# Patient Record
Sex: FEMALE | Race: ASIAN | URBAN - METROPOLITAN AREA
[De-identification: names, ages, dates, MRNs, and addresses within clinical notes are randomized per-mention and may not be internally consistent; named-entity substitution may affect disease eponyms.]

---

## 2024-01-18 ENCOUNTER — NURSE TRIAGE (OUTPATIENT)
Dept: OTHER | Age: 24
End: 2024-01-18

## 2024-01-18 NOTE — TELEPHONE ENCOUNTER
Please reply to pool: EM RN TRIAGE  Action Requested: Summary for Provider     []  Critical Lab, Recommendations Needed  [] Need Additional Advice  [x]   FYI    []   Need Orders  [] Need Medications Sent to Pharmacy  []  Other     SUMMARY: New patient contacts clinic reporting back pain that began yesterday.  Low back, denies radiating pain, urinary symptoms or loss of bowel/bladder control.  No known trauma.  Acute visit offered today, patient requests Saturday.  Acute visit scheduled.  Transferred to registration to update insurance.    Reason for call: Back Pain  Onset: Data Unavailable                       Reason for Disposition   Patient wants to be seen    Protocols used: Back Pain-A-[place]

## 2024-03-13 ENCOUNTER — OFFICE VISIT (OUTPATIENT)
Dept: OBGYN CLINIC | Facility: CLINIC | Age: 24
End: 2024-03-13
Payer: COMMERCIAL

## 2024-03-13 ENCOUNTER — LAB ENCOUNTER (OUTPATIENT)
Dept: LAB | Facility: REFERENCE LAB | Age: 24
End: 2024-03-13
Attending: OBSTETRICS & GYNECOLOGY
Payer: COMMERCIAL

## 2024-03-13 VITALS
DIASTOLIC BLOOD PRESSURE: 60 MMHG | WEIGHT: 126 LBS | BODY MASS INDEX: 20.99 KG/M2 | HEIGHT: 65 IN | SYSTOLIC BLOOD PRESSURE: 102 MMHG

## 2024-03-13 DIAGNOSIS — N93.9 ABNORMAL UTERINE BLEEDING: ICD-10-CM

## 2024-03-13 DIAGNOSIS — Z12.4 SCREENING FOR CERVICAL CANCER: ICD-10-CM

## 2024-03-13 DIAGNOSIS — N93.9 ABNORMAL UTERINE BLEEDING: Primary | ICD-10-CM

## 2024-03-13 LAB
DEPRECATED RDW RBC AUTO: 46.4 FL (ref 35.1–46.3)
ERYTHROCYTE [DISTWIDTH] IN BLOOD BY AUTOMATED COUNT: 14 % (ref 11–15)
FSH SERPL-ACNC: 5.5 MIU/ML
HCT VFR BLD AUTO: 42 %
HGB BLD-MCNC: 14.1 G/DL
LH SERPL-ACNC: 2.9 MIU/ML
MCH RBC QN AUTO: 30.4 PG (ref 26–34)
MCHC RBC AUTO-ENTMCNC: 33.6 G/DL (ref 31–37)
MCV RBC AUTO: 90.5 FL
PLATELET # BLD AUTO: 254 10(3)UL (ref 150–450)
PROLACTIN SERPL-MCNC: 9.6 NG/ML
RBC # BLD AUTO: 4.64 X10(6)UL
TSI SER-ACNC: 1.51 MIU/ML (ref 0.55–4.78)
WBC # BLD AUTO: 5.6 X10(3) UL (ref 4–11)

## 2024-03-13 PROCEDURE — 87491 CHLMYD TRACH DNA AMP PROBE: CPT | Performed by: OBSTETRICS & GYNECOLOGY

## 2024-03-13 PROCEDURE — 83002 ASSAY OF GONADOTROPIN (LH): CPT | Performed by: OBSTETRICS & GYNECOLOGY

## 2024-03-13 PROCEDURE — 99204 OFFICE O/P NEW MOD 45 MIN: CPT | Performed by: OBSTETRICS & GYNECOLOGY

## 2024-03-13 PROCEDURE — 3074F SYST BP LT 130 MM HG: CPT | Performed by: OBSTETRICS & GYNECOLOGY

## 2024-03-13 PROCEDURE — 3078F DIAST BP <80 MM HG: CPT | Performed by: OBSTETRICS & GYNECOLOGY

## 2024-03-13 PROCEDURE — 99385 PREV VISIT NEW AGE 18-39: CPT | Performed by: OBSTETRICS & GYNECOLOGY

## 2024-03-13 PROCEDURE — 85027 COMPLETE CBC AUTOMATED: CPT | Performed by: OBSTETRICS & GYNECOLOGY

## 2024-03-13 PROCEDURE — 87591 N.GONORRHOEAE DNA AMP PROB: CPT | Performed by: OBSTETRICS & GYNECOLOGY

## 2024-03-13 PROCEDURE — 87624 HPV HI-RISK TYP POOLED RSLT: CPT | Performed by: OBSTETRICS & GYNECOLOGY

## 2024-03-13 PROCEDURE — 84146 ASSAY OF PROLACTIN: CPT | Performed by: OBSTETRICS & GYNECOLOGY

## 2024-03-13 PROCEDURE — 3008F BODY MASS INDEX DOCD: CPT | Performed by: OBSTETRICS & GYNECOLOGY

## 2024-03-13 PROCEDURE — 83001 ASSAY OF GONADOTROPIN (FSH): CPT | Performed by: OBSTETRICS & GYNECOLOGY

## 2024-03-13 PROCEDURE — 84443 ASSAY THYROID STIM HORMONE: CPT | Performed by: OBSTETRICS & GYNECOLOGY

## 2024-03-13 RX ORDER — TRANEXAMIC ACID 650 MG/1
1300 TABLET ORAL EVERY 8 HOURS PRN
Qty: 50 TABLET | Refills: 2 | Status: SHIPPED | OUTPATIENT
Start: 2024-03-13 | End: 2024-03-18

## 2024-03-13 NOTE — PROGRESS NOTES
GYN H&P     3/13/2024  12:56 PM    CC: Patient is here for abnormal uterine bleeding. She also needs pap.     HPI: Patient is a 23 year old  for 3 M hx of vaginal bleeding daily. When she was 17 she had a similar episode. For the past yeart she has had irregular periods. She could bleeding 1 - 2 W inbetween. No skipped months. She does describe very heavy bleeding, no pain with periods.     No previous evaluation. She has a Nexplanon placed 2022, and her bleeding has become worse since.     She is sexually active, does not use condoms.     Depression Screening (PHQ-2/PHQ-9): Over the LAST 2 WEEKS   Little interest or pleasure in doing things (over the last two weeks)?: Not at all    Feeling down, depressed, or hopeless (over the last two weeks)?: Not at all    PHQ-2 SCORE: 0            Patient's last menstrual period was 2024.    OB History    Para Term  AB Living   0 0 0 0 0 0   SAB IAB Ectopic Multiple Live Births   0 0 0 0 0       GYN hx:    Hx Prior Abnormal Pap: No  Pap Date:  (pt last pap )  Pap Result Notes: normal    STI HX: no hx of STI      Past Medical History:   Diagnosis Date    Depression     Hx of migraine headaches     no auras     History reviewed. No pertinent surgical history.  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        Medications reviewed. See active list.     /60   Ht 65\"   Wt 126 lb (57.2 kg)   LMP 02/28/2024   BMI 20.97 kg/m²       Exam:   GENERAL: well developed, well nourished, in no apparent distress  SKIN: no rashes, no suspicious lesions  HEENT: normal  NECK: supple; no thyroidmegaly, no adenopathy  BREASTS: symmetrical, nontender, no palpable masses or nodes, no nipple discharge, no skin changes, no dippling, no palpable axillary adenopathy  ABDOMEN: Soft, non distended; non tender, no masses.  Liver and spleen non-tender, no enlargement. No palpable hernias  GYNE/:  External Genitalia: Normal appearing, no lesions, normal hair distribution   Urethral meatus appear wnl, no abnormal discharge or lesions noted.   Bladder: well supported, urethra wnl, no palpable tenderness or masses, no discharge  Vagina: normal pink mucosa, no lesions, normal clear discharge.   Uterus: midline, mobile, non-tender, firm and smooth  Cervix: pink, no lesions grossly visible, no discharge + large amount of blood from os  Adnexa: non tender, no palpable masses, normal size  Anus:  No lesions or visible hemorrhoids        A/P: Patient is 23 year old female     1. Abnormal uterine bleeding  - Transvaginal US GYNE Only [78911]; Future  - FSH; Future  - LH (Luteinizing Hormone); Future  - Prolactin  - TSH W Reflex To Free T4; Future  - CBC, Platelet; No Differential; Future  - tranexamic acid (LYSTEDA) 650 MG Oral Tab; Take 2 tablets (1,300 mg total) by mouth every 8 (eight) hours as needed (for heavy vaginal bleeding.).  Dispense: 50 tablet; Refill: 2    Check above. If normal consider stopping Nexplanon and switch to Mirena IUD.   Kira Madrigal MD

## 2024-03-14 LAB
C TRACH DNA SPEC QL NAA+PROBE: NEGATIVE
HPV I/H RISK 1 DNA SPEC QL NAA+PROBE: NEGATIVE
N GONORRHOEA DNA SPEC QL NAA+PROBE: NEGATIVE

## 2024-03-20 ENCOUNTER — ULTRASOUND ENCOUNTER (OUTPATIENT)
Dept: OBGYN CLINIC | Facility: CLINIC | Age: 24
End: 2024-03-20
Payer: COMMERCIAL

## 2024-03-20 DIAGNOSIS — N93.9 ABNORMAL UTERINE BLEEDING (AUB): Primary | ICD-10-CM

## 2024-03-20 PROCEDURE — 76830 TRANSVAGINAL US NON-OB: CPT | Performed by: OBSTETRICS & GYNECOLOGY

## 2024-03-20 PROCEDURE — 76856 US EXAM PELVIC COMPLETE: CPT | Performed by: OBSTETRICS & GYNECOLOGY

## 2024-07-18 ENCOUNTER — OFFICE VISIT (OUTPATIENT)
Dept: FAMILY MEDICINE CLINIC | Facility: CLINIC | Age: 24
End: 2024-07-18
Payer: COMMERCIAL

## 2024-07-18 ENCOUNTER — OFFICE VISIT (OUTPATIENT)
Dept: FAMILY MEDICINE CLINIC | Facility: CLINIC | Age: 24
End: 2024-07-18

## 2024-07-18 VITALS
SYSTOLIC BLOOD PRESSURE: 110 MMHG | HEIGHT: 65 IN | WEIGHT: 125 LBS | BODY MASS INDEX: 20.83 KG/M2 | HEART RATE: 74 BPM | OXYGEN SATURATION: 98 % | TEMPERATURE: 99 F | DIASTOLIC BLOOD PRESSURE: 82 MMHG | RESPIRATION RATE: 16 BRPM

## 2024-07-18 DIAGNOSIS — Z02.0 ENCOUNTER FOR SCHOOL HISTORY AND PHYSICAL EXAMINATION: Primary | ICD-10-CM

## 2024-07-18 DIAGNOSIS — Z11.1 ENCOUNTER FOR PPD TEST: Primary | ICD-10-CM

## 2024-07-18 PROCEDURE — 86580 TB INTRADERMAL TEST: CPT | Performed by: NURSE PRACTITIONER

## 2024-07-18 PROCEDURE — 99395 PREV VISIT EST AGE 18-39: CPT | Performed by: NURSE PRACTITIONER

## 2024-07-18 NOTE — PROGRESS NOTES
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     Patient has concerns of none.    Seizure like activity noted at home 2 years ago (first time it happened). Was seen in ER. Advised to see neuro - never did. Monday (3 days ago) - happened again. Trembling in bed. No LOC. No loss of bowel/bladder. Placed call to neuro - awaiting call back to schedule appt.     Immunizations needed today: none. TB test done at visit.     Current Outpatient Medications   Medication Sig Dispense Refill    buPROPion 150 MG Oral Tablet 24 Hr       Etonogestrel 68 MG Subcutaneous Implant Inject 1 each into the skin continuous.      topiramate 100 MG Oral Tab         Past Medical History:    Depression    Hx of migraine headaches    no auras      History reviewed. No pertinent surgical history.   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       Occ: Nurse. : No. Children: No.   Exercise: three times per week, healthclub.  Diet: doesn't watch      REVIEW OF SYSTEMS:   GENERAL: Feels well. No night sweats. No Fevers.   SKIN: denies any unusual skin lesions  EYES:denies blurred vision or double vision  HEENT: denies nasal congestion, sinus pain or sore throat  LUNGS: denies shortness of breath at rest on on exertion  CARDIOVASCULAR: denies chest pain or palpitations   GI: denies abdominal pain or heartburn.  No N/V/C/D.  MUSCULOSKELETAL: denies back pain or other joint pain  NEURO: denies headaches  PSYCHE: + hx depression on medication, some anxiety also controlled with meds  HEMATOLOGIC: denies hx of anemia or other bleeding disorders  ENDOCRINE: denies thyroid history  ALLERGY/ASTHMA: no hx of seasonal allergies or asthma    EXAM:   /82   Pulse 74   Temp 98.6 °F (37 °C)   Resp 16   Ht 5' 5\" (1.651 m)   Wt 125 lb (56.7 kg)   LMP 02/28/2024   SpO2 98%   BMI 20.80 kg/m²  Body mass index is 20.8 kg/m².     GENERAL: well developed, well nourished,in no apparent distress  SKIN: no rashes,no suspicious lesions  HEENT: atraumatic, normocephalic,ears and throat are clear  EYES:PERRLA, EOMI, normal optic disk,conjunctiva are clear  NECK: supple,no adenopathy,no bruits  CHEST: no chest tenderness  LUNGS: Clear to auscultation bilaterally. No diminished breath sounds. No wheezing, rhonchi, or rales.  CARDIO: RRR without murmur  GI: BS's present x4., No tenderness of palpation.  No obvious masses or palpable organomegaly   MUSCULOSKELETAL: back is not tender, ROM of the back fully intact  EXTREMITIES: no cyanosis, clubbing or edema  NEURO: Alert & Oriented x3. Cranial nerves are grossly intact.     ASSESSMENT AND PLAN:   Yeonji Go is a 23 year old female who presents for a school physical.    ASSESSMENT:     PLAN: Patient was found free of any mental or physical condition which would prevent her from performing her work.  Physical form was filled out. Patient was encouraged to watch exercise and dietary habits.     Form filled out and given to patient.   I did make note of questionable seizure like activity and pending f/u with neuro. Advised any restrictions pending neuro visit/findings.    Patient's questions/concerns were addressed and answered. Patient is in agreement with treatment plan.     Patient is to follow up as needed with PCP or here in clinic.

## 2024-07-18 NOTE — PROGRESS NOTES
Pt presents for tb test. No contraindications for screening after reviewing pt questionnaire.  PPD test administered to left forearm. Pt tolerated well. Discussed and reviewed the return-window to have test read (return to clinic in 48-72 hours to have results of TB test read). Patient aware if she does not return during this time, the test will need to be repeated. Paper documentation of TB test with results will be provided to patient at the time of read. Pt verbalized understanding.

## 2024-07-18 NOTE — PATIENT INSTRUCTIONS
Please return to clinic on 7/19/24 after 4:15pm  or on 7/20/24 before 4:15pm  to have your TB test read.    Our clinic hours are:  Monday-Friday        8:00 am to 7:30 pm  Saturday/Sunday    9:00 am to 4:30 pm    You can go to any of the [facility] to have your TB test read.  To find your nearest Walk-In Clinic go to [email or web address]

## 2024-07-18 NOTE — PATIENT INSTRUCTIONS
Please return to clinic on 7/19/24 after 4:15pm  or on 7/20/24 before 4:15pm  to have your TB test read.    Our clinic hours are:  Monday-Friday        8:00 am to 7:30 pm  Saturday/Sunday    9:00 am to 4:30 pm    You can go to any of the [facility] Walk-In Clinics to have your TB test read.  To find your nearest Walk-In Clinic go to [email or web address]

## 2024-07-21 ENCOUNTER — OFFICE VISIT (OUTPATIENT)
Dept: FAMILY MEDICINE CLINIC | Facility: CLINIC | Age: 24
End: 2024-07-21
Payer: COMMERCIAL

## 2024-07-21 DIAGNOSIS — Z11.1 SCREENING-PULMONARY TB: Primary | ICD-10-CM

## 2024-07-21 LAB — INDURATION (): 0 MM (ref 0–11)

## 2024-07-21 NOTE — PROGRESS NOTES
Patient presents for PPD read.     Test placed on 7/18/2024    Results: 0 mm induration.     Letter of results printed and given patient.   Patient to return in 1 week for second PPD to be placed.  No further questions or concerns at this time.     Encounter Diagnosis   Name Primary?    Screening-pulmonary TB Yes

## 2024-10-04 ENCOUNTER — HOSPITAL ENCOUNTER (OUTPATIENT)
Age: 24
Discharge: HOME | End: 2024-10-04
Payer: COMMERCIAL

## 2024-10-04 ENCOUNTER — APPOINTMENT (OUTPATIENT)
Dept: GENERAL RADIOLOGY | Age: 24
End: 2024-10-04
Attending: NURSE PRACTITIONER
Payer: COMMERCIAL

## 2024-10-04 ENCOUNTER — APPOINTMENT (OUTPATIENT)
Dept: ULTRASOUND IMAGING | Age: 24
End: 2024-10-04
Attending: NURSE PRACTITIONER
Payer: COMMERCIAL

## 2024-10-04 VITALS
DIASTOLIC BLOOD PRESSURE: 70 MMHG | HEART RATE: 69 BPM | TEMPERATURE: 98 F | SYSTOLIC BLOOD PRESSURE: 113 MMHG | OXYGEN SATURATION: 100 % | RESPIRATION RATE: 20 BRPM

## 2024-10-04 DIAGNOSIS — M25.561 RIGHT KNEE PAIN, UNSPECIFIED CHRONICITY: ICD-10-CM

## 2024-10-04 DIAGNOSIS — M25.571 RIGHT ANKLE PAIN, UNSPECIFIED CHRONICITY: ICD-10-CM

## 2024-10-04 DIAGNOSIS — M54.50 RIGHT LOW BACK PAIN, UNSPECIFIED CHRONICITY, UNSPECIFIED WHETHER SCIATICA PRESENT: ICD-10-CM

## 2024-10-04 DIAGNOSIS — M79.661 RIGHT CALF PAIN: Primary | ICD-10-CM

## 2024-10-04 LAB — B-HCG UR QL: NEGATIVE

## 2024-10-04 PROCEDURE — 73610 X-RAY EXAM OF ANKLE: CPT | Performed by: NURSE PRACTITIONER

## 2024-10-04 PROCEDURE — 72100 X-RAY EXAM L-S SPINE 2/3 VWS: CPT | Performed by: NURSE PRACTITIONER

## 2024-10-04 PROCEDURE — 73560 X-RAY EXAM OF KNEE 1 OR 2: CPT | Performed by: NURSE PRACTITIONER

## 2024-10-04 PROCEDURE — 81025 URINE PREGNANCY TEST: CPT | Performed by: NURSE PRACTITIONER

## 2024-10-04 PROCEDURE — 99214 OFFICE O/P EST MOD 30 MIN: CPT | Performed by: NURSE PRACTITIONER

## 2024-10-04 PROCEDURE — 93971 EXTREMITY STUDY: CPT | Performed by: NURSE PRACTITIONER

## 2024-10-04 RX ORDER — METHOCARBAMOL 500 MG/1
500 TABLET, FILM COATED ORAL 2 TIMES DAILY PRN
Qty: 10 TABLET | Refills: 0 | Status: SHIPPED | OUTPATIENT
Start: 2024-10-04

## 2024-10-04 NOTE — ED INITIAL ASSESSMENT (HPI)
Pt here with complaints of right lower back pain , right knee  and right calf pain for 1 month, pt states she walks  a lot for work , pt state she started limping more this past week pt denies any injury

## 2024-10-04 NOTE — DISCHARGE INSTRUCTIONS
Follow-up with your primary care provider recommend you follow-up with the physiatrist for the back pain.  You may try topical lidocaine patch or IcyHot to the back you may take over-the-counter ibuprofen or Tylenol for pain.  You may alternate heat or ice to area of discomfort take the muscle relaxer as prescribed it will cause drowsiness no driving or operating machinery while taking this medication.  If you wake up cannot feel your lower extremities or ambulate unintentionally urinate or have a bowel movement on yourself develop any urinary symptoms abdominal pain fever nausea or vomiting go to the nearest emergency department.

## 2024-10-04 NOTE — ED PROVIDER NOTES
Patient Seen in: [facility]      History     Chief Complaint   Patient presents with    Back Pain    Knee Pain     Stated Complaint: back pain, R leg Pain    Subjective:   HPI  ED History source : Patient  This is a 23-year-old female with history of depression and migraine headaches presenting with back pain knee pain calf pain and ankle pain.  Patient states she has had pain for about a month feels most of the pain with walking and she does a lot of walking for work.  Patient states she was told that she does have a bunion on the right foot and they recommended that she wear a bigger shoe.  Denies falling or landing on her back numbness or tingling to extremities.  Patient states the pain now feels like it is in her calf and behind her knee mainly.  Patient states she is on birth control has a Nexplanon but no oral birth control.  No history of DVT or PE.  No chest pain or shortness of breath.  Denies any urinary symptoms saddle paresthesia or bowel or bladder incontinence.    Objective:     Past Medical History:    Depression    Hx of migraine headaches    no auras              History reviewed. No pertinent surgical history.             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               Review of Systems    Positive for stated complaint: back pain, R leg Pain  Other systems are as noted in HPI.  Constitutional and vital signs reviewed.      All other systems reviewed and negative except as noted above.    Physical Exam     ED Triage Vitals [10/04/24 1450]   /70    Pulse 69   Resp 20   Temp 97.7 °F (36.5 °C)   Temp src Temporal   SpO2 100 %   O2 Device None (Room air)       Current Vitals:   Vital Signs  BP: 113/70  Pulse: 69  Resp: 20  Temp: 97.7 °F (36.5 °C)  Temp src: Temporal    Oxygen Therapy  SpO2: 100 %  O2 Device: None (Room air)        Physical Exam  Vitals and nursing note reviewed.   Constitutional:       Appearance: Normal appearance.   HENT:      Right Ear: External ear normal.      Left Ear: External ear normal.      Nose: Nose normal.      Mouth/Throat:      Mouth: Mucous membranes are moist.      Pharynx: Oropharynx is clear.   Eyes:      Conjunctiva/sclera: Conjunctivae normal.   Cardiovascular:      Rate and Rhythm: Normal rate.   Pulmonary:      Effort: Pulmonary effort is normal.   Abdominal:      Palpations: Abdomen is soft.      Tenderness: There is no abdominal tenderness. There is no right CVA tenderness or left CVA tenderness.   Musculoskeletal:         General: Normal range of motion.      Cervical back: Normal range of motion.      Lumbar back: Negative right straight leg raise test and negative left straight leg raise test.      Comments: No obvious obvious unilateral swelling to the lower extremities but complaint of pain with palpation of the right calf and posterior knee no erythema or warmth.  No TTP anterior right knee no ankle TTP no hip or right lumbar region TTP normal ROM DPs 2+ surrounding compartments are soft.    Cervical thoracic lumbar spine no midline TTP or step-offs   Skin:     General: Skin is warm.      Capillary Refill: Capillary refill takes less than 2 seconds.   Neurological:      General: No focal deficit present.      Mental Status: She is alert and oriented to person, place, and time.           ED Course     Labs Reviewed   POCT PREGNANCY URINE - Normal        XR LUMBAR SPINE (MIN 2 VIEWS) (CPT=72100)    Result Date: 10/4/2024  CONCLUSION:  1. No fracture or subluxation.  Minimal mid lumbar dextroscoliosis.  No  significant spondylosis.    Dictated by (CST): Unruly Harrington MD on 10/04/2024 at 3:41 PM     Finalized by (CST): Unruly Harrington MD on 10/04/2024 at 3:45 PM          XR KNEE (1 OR 2 VIEWS), RIGHT (CPT=73560)    Result Date: 10/4/2024  CONCLUSION: Normal examination.     Dictated by (CST): Unruly Harrington MD on 10/04/2024 at 3:40 PM     Finalized by (CST): Unruly Harrington MD on 10/04/2024 at 3:41 PM          XR ANKLE (MIN 3 VIEWS), RIGHT (CPT=73610)    Result Date: 10/4/2024  CONCLUSION: Normal examination.     Dictated by (CST): Unruly Harrington MD on 10/04/2024 at 3:40 PM     Finalized by (CST): Unruly Harrington MD on 10/04/2024 at 3:40 PM          US VENOUS DOPPLER LEG RIGHT - DIAG IMG (CPT=93971)    Result Date: 10/4/2024  CONCLUSION: No evidence of right lower extremity DVT.   Dictated by (CST): Jaret Read MD on 10/04/2024 at 3:18 PM     Finalized by (CST): Jaret Read MD on 10/04/2024 at 3:20 PM                MDM       Medical Decision Making  23-year-old female ambulatory with steady gait with a month of right lower back pain right knee right ankle and now right calf pain.  DDx radiculopathy versus acute low back pain with sciatica versus DVT versus Baker's cyst versus calf strain or sprain versus arthralgia.  Discussed with the patient ultrasound of the right lower extremity will obtain a UCG for lumbar spine x-ray knee x-ray and ankle x-ray anticipate referring patient to physiatry for further evaluation.  Patient is agreeable with this plan of care.    UCG negative  Ultrasound is resulted above  All x-rays independently viewed by this provider no fractures noted    Discussed results with the patient discussed could be radicular pain or low back pain with sciatica or arthralgia discussed recommended following up with PCP and physiatry discussed muscle relaxer that will be prescribed to help loosen up the muscles discussed over-the-counter ibuprofen and Tylenol for pain may try lidocaine patch or IcyHot or  alternating heat or ice discussed strict ER precautions with any new or worsening symptoms.  Patient acknowledged understanding discharge instructions.      Problems Addressed:  Right ankle pain, unspecified chronicity: acute illness or injury  Right calf pain: acute illness or injury  Right knee pain, unspecified chronicity: acute illness or injury  Right low back pain, unspecified chronicity, unspecified whether sciatica present: acute illness or injury    Amount and/or Complexity of Data Reviewed  Radiology: ordered and independent interpretation performed. Decision-making details documented in ED Course.    Risk  OTC drugs.  Prescription drug management.        Disposition and Plan     Clinical Impression:  1. Right calf pain    2. Right low back pain, unspecified chronicity, unspecified whether sciatica present    3. Right knee pain, unspecified chronicity    4. Right ankle pain, unspecified chronicity         Disposition:  Discharge  10/4/2024  3:46 pm    Follow-up:  Meena Montez MD  [address]  [facility] [id number] [phone]      Resource for primary care doctor to follow-up with if you do not have one    Angel Rose MD  [address]  [place] [id number] [phone]      Physiatrist to follow-up with    Annie Ross DPM  [address]  [place] [id number] [phone]      Podiatrist to follow-up with          Medications Prescribed:  Current Discharge Medication List        START taking these medications    Details   methocarbamol 500 MG Oral Tab Take 1 tablet (500 mg total) by mouth 2 (two) times daily as needed (Muscle spasms May cause drowsiness no driving or operating machinery while taking this medication).  Qty: 10 tablet, Refills: 0    Associated Diagnoses: Right calf pain; Right low back pain, unspecified chronicity, unspecified whether sciatica present; Right knee pain, unspecified chronicity; Right ankle pain, unspecified chronicity                  Supplementary Documentation:

## 2024-12-09 ENCOUNTER — OFFICE VISIT (OUTPATIENT)
Dept: OBGYN CLINIC | Facility: CLINIC | Age: 24
End: 2024-12-09
Payer: COMMERCIAL

## 2024-12-09 VITALS
WEIGHT: 129 LBS | SYSTOLIC BLOOD PRESSURE: 116 MMHG | HEIGHT: 65 IN | BODY MASS INDEX: 21.49 KG/M2 | DIASTOLIC BLOOD PRESSURE: 70 MMHG

## 2024-12-09 DIAGNOSIS — Z30.46 ENCOUNTER FOR REMOVAL OF SUBDERMAL CONTRACEPTIVE IMPLANT: Primary | ICD-10-CM

## 2024-12-09 NOTE — PROGRESS NOTES
Here for Nexplanon removal. Plans to use Nuvaring.    L arm cleaned, injected with 2 ml of 1% lidocaine  Small incision made and Nexplanon removed intact.     FU as needed.

## (undated) NOTE — LETTER
July 21, 2024    Yeonji Go  [address]  [address]  [facility] [address]      Dear Yeonji:    The following are the results of your recent tests. Please review the list of test results.  Your result is the value on the left; we have also supplied the range of normal (low and high) values.    Results for orders placed or performed in visit on 07/18/24   TB test, PPD/Tubersol/Aplisol (71257) (DX V74.1/Z11.1)   Result Value Ref Range    Date Given: 7/18/2024     Site: left     INDURATION (PPD) 0 0.0 - 11 mm       Please call if you have further questions,    ALCON Elizabeth

## (undated) NOTE — LETTER
Yeonji Nicko, :2000    CONSENT FOR PROCEDURE/SEDATION    1. I authorize the performance upon Yeonji Nicok  the following: Nexplanon Removal    2. I authorize Dr. Kira Madrigal MD (and whomever is designated as the doctor’s assistant), to perform the above-mentioned procedures.    3. If any unforeseen conditions arise during this procedure calling for additional  procedures, operations, or medications (including anesthesia and blood transfusion), I further request and authorize the doctor to do whatever he/she deems advisable in my interest.    4. I consent to the taking and reproduction of any photographs in the course of this procedure for professional purposes.    5. I consent to the administration of such sedation as may be considered necessary or advisable by the physician responsible for this service, with the exception of ______________________________________________________    6. I have been informed by my doctor of the nature and purpose of this procedure sedation, possible alternative methods of treatment, risk involved and possible complications.    Signature of Patient:_______________________________________________    Signature of person authorized to consent for patient:  _______________________________________________________________    Relationship to patient: ____________________________________________    Witness: _________________________________________ Date:___________     Physician Signature: _______________________________ Date:___________

## (undated) NOTE — MR AVS SNAPSHOT
After Visit Summary   3/13/2024    Yeonji Go   MRN: [id number]           Visit Information     Date & Time  3/13/2024 12:45 PM Provider  Kira Madrigal MD [facility] - OB/GYN Dept. Phone  [phone]      Your Vitals Were  Most recent update: 3/13/2024 12:38 PM    BP   102/60    Ht   65\"    Wt   126 lb    LMP   02/28/2024    BMI   20.97 kg/m²         Allergies as of 3/13/2024  Review status set to Review Complete on 3/13/2024   No Known Allergies     Your Current Medications        Dosage    tranexamic acid (LYSTEDA) 650 MG Oral Tab Take 2 tablets (1,300 mg total) by mouth every 8 (eight) hours as needed (for heavy vaginal bleeding.).    buPROPion 150 MG Oral Tablet 24 Hr     Etonogestrel 68 MG Subcutaneous Implant Inject 1 each into the skin continuous.    topiramate 100 MG Oral Tab       Diagnoses for This Visit    Abnormal uterine bleeding   [621907]  -  Primary  Screening for cervical cancer   [757492]             We Ordered the Following     Normal Orders This Visit    Chlamydia/Gc Amplification [7165442 CUSTOM]     Hpv Dna  High Risk , Thin Prep Collect [[id number] CUSTOM]     Prolactin [8356451 CUSTOM]     THINPREP PAP SMEAR ONLY [FHJ4592 CUSTOM]     ThinPrep PAP Smear [GWF2976 CUSTOM]     Future Labs/Procedures Expected by Expires    CBC, Platelet; No Differential [[id number] CUSTOM]  3/13/2024 (Approximate) 3/13/2025    Chlamydia/Gc Amplification [[id number] CUSTOM]  3/13/2024 (Approximate) 3/13/2025    FSH [[id number] CUSTOM]  3/13/2024 (Approximate) 3/13/2025    Hpv Dna  High Risk , Thin Prep Collect [[id number] CUSTOM]  3/13/2024 3/13/2025    LH (Luteinizing Hormone) [[id number] CUSTOM]  3/13/2024 (Approximate) 3/13/2025    ThinPrep PAP Smear [[id number] CUSTOM]  3/13/2024 3/13/2025    Transvaginal US [facility] Only [20706] [26072613 CPT(R)]  3/13/2024 (Approximate) 3/13/2025    TSH W Reflex To Free T4 [[id number] CUSTOM]  3/13/2024 (Approximate) 3/13/2025       Future Appointments        Provider Department    3/20/2024 1:30 PM [facility] 10 ULTRASOUND OP [facility] - OB/GYN      Imaging Scheduling Instructions     Around March 13, 2024   Imaging:   Transvaginal US [facility] Only [21299]                    [place] you [place] [facility] [place] offer Video Visits through [employer] for adult patients for a variety of conditions such as allergies, back pain and cold symptoms? Skip the drive and waiting room and online chat with a doctor face-to-face using your web-cam enabled computer or mobile device wherever you are. Video Visits cost $50 and can be paid hassle-free using a credit, debit, or health savings card.  Not active on [employer]? Ask us how to get signed up today!          If you receive a survey from [facility], please take a few minutes to complete it and provide feedback. We strive to deliver the best patient experience and are looking for ways to make improvements. Your feedback will help us do so. For more information on [facility], please visit [email or web address]           No text in SmartText           No text in SmartText